# Patient Record
Sex: FEMALE | Race: WHITE | Employment: FULL TIME | URBAN - METROPOLITAN AREA
[De-identification: names, ages, dates, MRNs, and addresses within clinical notes are randomized per-mention and may not be internally consistent; named-entity substitution may affect disease eponyms.]

---

## 2018-05-24 ENCOUNTER — TRANSFERRED RECORDS (OUTPATIENT)
Dept: HEALTH INFORMATION MANAGEMENT | Facility: CLINIC | Age: 28
End: 2018-05-24

## 2018-06-19 ENCOUNTER — TRANSFERRED RECORDS (OUTPATIENT)
Dept: HEALTH INFORMATION MANAGEMENT | Facility: CLINIC | Age: 28
End: 2018-06-19

## 2018-07-17 ENCOUNTER — TRANSFERRED RECORDS (OUTPATIENT)
Dept: HEALTH INFORMATION MANAGEMENT | Facility: CLINIC | Age: 28
End: 2018-07-17

## 2020-02-20 ENCOUNTER — HOSPITAL PATHOLOGY (OUTPATIENT)
Dept: OTHER | Facility: CLINIC | Age: 30
End: 2020-02-20

## 2020-02-24 LAB — COPATH REPORT: NORMAL

## 2023-12-12 ENCOUNTER — OFFICE VISIT (OUTPATIENT)
Dept: PLASTIC SURGERY | Facility: CLINIC | Age: 33
End: 2023-12-12
Payer: COMMERCIAL

## 2023-12-12 DIAGNOSIS — J34.89 NASAL SEPTAL PERFORATION: Primary | ICD-10-CM

## 2023-12-12 NOTE — PROGRESS NOTES
Facial Plastic and Reconstructive Surgery Consultation    Referring Provider:   Referred Self, MD  No address on file    HPI:   I had the pleasure of seeing Abbie Laguerre today in clinic for consultation for nasal septal perforation. Abbie Laguerre is a 33 year old female.  She reports having a history of a septoplasty about 10 years ago.  After that time, she noticed fairly soon after the surgery that she had a hole.  It grew initially, but she thinks it has been stable.  When she initially had the hole, she had a lot of crusting and bleeding but this has not been what she is experienced more recently.  She uses AYR gel and sometimes nasal saline spray.  Besides the one prior septoplasty, she has never had any other nasal surgery.  She has no difficulties with breathing through her nose.        Review Of Systems  ROS: 10 point ROS neg other than the symptoms noted above in the HPI.    Patient Active Problem List   Diagnosis     Knee pain     Past Surgical History:   Procedure Laterality Date     DENTAL SURGERY      x5     SEPTOPLASTY, TURBINOPLASTY, COMBINED  3/16/2012    Procedure:COMBINED SEPTOPLASTY, TURBINOPLASTY; SEPTOPLASTY, TURBINOPLASTY; Surgeon:CARMITA BENAVIDES; Location: OR     Current Outpatient Medications   Medication Sig Dispense Refill     HYDROcodone-acetaminophen 5-325 MG per tablet Take 1-2 tablets by mouth every 4 hours as needed for other (Moderate to Severe Pain). 30 tablet 0     Patient has no known allergies.  Social History     Socioeconomic History     Marital status: Single     Spouse name: Not on file     Number of children: Not on file     Years of education: Not on file     Highest education level: Not on file   Occupational History     Not on file   Tobacco Use     Smoking status: Never     Smokeless tobacco: Not on file   Substance and Sexual Activity     Alcohol use: Yes     Comment: occas     Drug use: No     Sexual activity: Not on file   Other Topics Concern     Not on file   Social  History Narrative     Not on file     Social Determinants of Health     Financial Resource Strain: Not on file   Food Insecurity: Not on file   Transportation Needs: Not on file   Physical Activity: Not on file   Stress: Not on file   Social Connections: Not on file   Interpersonal Safety: Not on file   Housing Stability: Not on file     No family history on file.    PE:  Alert and Oriented, Answering Questions Appropriately  Atraumatic, Normocephalic, Face Symmetric  Skin: Rojas 2  Facial Nerve Intact and facial movement symmetric  EOMI  Nasal Exam: She has some collapse of her cartilaginous dorsum with an early saddle deformity.  She has lost some support in her tip.  Anterior anoscopy reveals pink and healthy mucosa.  See procedure note below.  Chin: Normal   Lips/Teeth/Toungue/Gums: Lips intact  Neck: Trachea midline  Chest: No wheezing, cyanosis, or stridor  Card: not diaphoretic  Neuro/Psych: CN's 2-12 intact, Moves all extremities, ambulation in intact, positive affect, no notable muscle weakness          PROCEDURE:Nasal endoscopy    Indication: Nasal Endoscopy is performed to provide a more thorough evaluation of the nasal airway than seen on standard nasal speculum exam.    Performed by: Dr. Fe Roche MD  Written consent was obtained prior to procedure.     Findings are as follows:   She has a large cartilaginous perforation that is 2.25 cm in length and 1 cm in height.  The edges are healthy.  No significant crusting.  She has about 8 mm of caudal septum.          IMPRESSION/PLAN: Abbie Laguerre is a 33 year old female with a nasal septal perforation.  We discussed that this is most likely secondary to her surgery that she had shortly before she noticed the whole.  She has no history of any autoimmune conditions.  The perforation is healthy.  She has no breathing difficulties.  She does have an early saddle deformity, but this does not bother her.  She is doing a good job of keeping the  perforation healthy.  We discussed that it is likely the perforation was caused by the prior surgery.  I think watchful waiting is completely appropriate at this point, but I do want to keep a close eye on the perforation.  We discussed that if it is getting bigger or changing we would likely want to go forward with surgery sooner rather than later.  We did discuss what surgery would look like including open septorhinoplasty approach and autologous rib graft harvest.  I will see her back in 6 months to reevaluate the perforation, I counseled her to come in sooner if she notices any changes prior to that time. She will continue saline spray and Ayr gel.         Photodocumentation was obtained.

## 2023-12-13 NOTE — PROGRESS NOTES
Updated photodocumentation obtained (see media tab).    Signed informed consent for nasal endoscopy obtained.    Mireya Muhammad RN  12/13/2023 12:04 PM

## 2024-05-01 ENCOUNTER — TRANSCRIBE ORDERS (OUTPATIENT)
Dept: CALL CENTER | Age: 34
End: 2024-05-01
Payer: COMMERCIAL

## 2024-05-01 DIAGNOSIS — B99.9 INFECTION: Primary | ICD-10-CM

## 2024-05-02 ENCOUNTER — TELEPHONE (OUTPATIENT)
Dept: INFECTIOUS DISEASES | Facility: CLINIC | Age: 34
End: 2024-05-02
Payer: COMMERCIAL

## 2024-05-02 NOTE — TELEPHONE ENCOUNTER
Terell Fernandez MD Fontana, Lauren, DO; Polo Naik, CHAYA; Terell Aguilera MD; Ansley Lopez MD Hey,    Sorry, on inpatient and we have been getting slammed the past few days so hadn't looked at this yet.    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 Looking at her labs, I don't think I would call her LFTs elevated (only abnormal was an ALT of 40, now trickling down to 37), and she doesn't have eosinophilia thankfully, so I think a 5/29 appointment would be fine. By then, hopefully she's had the EGD/biopsy and we have some more data.    -Yovany          Previous Messages       ----- Message -----  From: Chelo Andres DO  Sent: 5/2/2024  11:48 AM CDT  To: Ansley Lopez MD; Polo Naik RN; *  Subject: RE: Referral questions                          Yovany or Ansley -Can you please review this to determine the urgency of consult? Polo had reached out to me but would appreciate your help in triaging the travel cases on timing. I can always help but want to include you in this process.    Chelo  ----- Message -----  From: Polo Naik RN  Sent: 5/2/2024   9:25 AM CDT  To: Chelo Andres DO  Subject: FW: Referral questions                          Hi Dr. Andres,  Wondering if you have time to review this patient's referral today to assess for urgency. I sent this message to the travel providers yesterday but haven't heard back yet. It looks like our next in-person 60m opening with a travel med provider is Dr. Fernandez's [facility] slots on 5/29.    Thanks!  Polo Naik RN  Infectious Disease on 5/2/2024 at 9:25 AM  ----- Message -----  From: Polo Naik RN  Sent: 5/1/2024   1:16 PM CDT  To: Ansley Lopez MD; Radha Vasquez MD; *  Subject: Referral questions                              Hello!    We received a  referral today for post-travel services for this patient.    - She traveled to [place] and [place] from 03/08-03/23.    - She had an office visit with Dr. Bill at [facility] on 4/26 and her symptoms are described as: Generalized abdominal discomfort, increased bowel movements, bloating. The patient developed abdominal pain nausea generalized abdominal discomfort and mild change in bowel movements but no overt liquidy stools. Her symptoms have persisted over the past month. Patient was prescribed ciprofloxacin 500 twice a day for 5 days by this provider. Patient also reports excessive burping.    - Denies fever, blood in stool, chest pain, cough or trouble swallowing.    - The patient has stool labs ordered but hasn't dropped them off yet. H. Pylori came back negative.    Wondering your thoughts on this patient-- our next 60 minute in-person appointment with a travel provider is Wednesday, May 29th in Dr. Fernandez's [facility] slots at the end of the day. Let me know your thoughts on getting this patient scheduled, if they need to be seen, the timing you would recommend, etc.    Thanks all,  Polo Naik RN  Infectious Disease on 5/1/2024 at 1:14 PM

## 2024-05-03 NOTE — CONFIDENTIAL NOTE
DIAGNOSIS:  Infection    DATE RECEIVED:  05.29.2024    NOTES (Gather within 2 years) STATUS DETAILS   OFFICE NOTE from referring provider       OFFICE NOTE from other specialist Care Everywhere 03.07.2024 Nely Vaca DO [facility]    03.01.2024 Consuelo Tovar MD [facility]   DISCHARGE SUMMARY from hospital     DISCHARGE REPORT from the ER     LABS (any labs) Care Everywhere    MEDICATION LIST Care Everywhere    IMAGING  (NEED IMAGES AND REPORTS)     Osteomyelitis: Foot imaging      Liver Abscess: Abdominal imaging     Other (anything related to diagnoses

## 2024-05-10 ENCOUNTER — LAB REQUISITION (OUTPATIENT)
Dept: LAB | Facility: CLINIC | Age: 34
End: 2024-05-10

## 2024-05-10 DIAGNOSIS — R10.9 UNSPECIFIED ABDOMINAL PAIN: ICD-10-CM

## 2024-05-10 DIAGNOSIS — Z13.1 ENCOUNTER FOR SCREENING FOR DIABETES MELLITUS: ICD-10-CM

## 2024-05-10 DIAGNOSIS — Z13.29 ENCOUNTER FOR SCREENING FOR OTHER SUSPECTED ENDOCRINE DISORDER: ICD-10-CM

## 2024-05-10 LAB
ERYTHROCYTE [DISTWIDTH] IN BLOOD BY AUTOMATED COUNT: 12.4 % (ref 10–15)
HBA1C MFR BLD: 5.1 %
HCT VFR BLD AUTO: 40.4 % (ref 35–47)
HGB BLD-MCNC: 14.1 G/DL (ref 11.7–15.7)
MCH RBC QN AUTO: 30.6 PG (ref 26.5–33)
MCHC RBC AUTO-ENTMCNC: 34.9 G/DL (ref 31.5–36.5)
MCV RBC AUTO: 88 FL (ref 78–100)
PLATELET # BLD AUTO: 338 10E3/UL (ref 150–450)
RBC # BLD AUTO: 4.61 10E6/UL (ref 3.8–5.2)
WBC # BLD AUTO: 6.6 10E3/UL (ref 4–11)

## 2024-05-10 PROCEDURE — 83036 HEMOGLOBIN GLYCOSYLATED A1C: CPT | Performed by: OBSTETRICS & GYNECOLOGY

## 2024-05-10 PROCEDURE — 84443 ASSAY THYROID STIM HORMONE: CPT | Performed by: OBSTETRICS & GYNECOLOGY

## 2024-05-10 PROCEDURE — 85027 COMPLETE CBC AUTOMATED: CPT | Performed by: OBSTETRICS & GYNECOLOGY

## 2024-05-11 LAB — TSH SERPL DL<=0.005 MIU/L-ACNC: 1.08 UIU/ML (ref 0.3–4.2)

## 2024-05-29 ENCOUNTER — PRE VISIT (OUTPATIENT)
Dept: INFECTIOUS DISEASES | Facility: CLINIC | Age: 34
End: 2024-05-29
Payer: COMMERCIAL

## 2024-06-26 ENCOUNTER — PREP FOR PROCEDURE (OUTPATIENT)
Dept: MULTI SPECIALTY CLINIC | Facility: CLINIC | Age: 34
End: 2024-06-26
Payer: COMMERCIAL

## 2024-07-06 ENCOUNTER — HEALTH MAINTENANCE LETTER (OUTPATIENT)
Age: 34
End: 2024-07-06

## 2024-07-12 ENCOUNTER — PREP FOR PROCEDURE (OUTPATIENT)
Dept: MULTI SPECIALTY CLINIC | Facility: CLINIC | Age: 34
End: 2024-07-12
Payer: COMMERCIAL

## 2024-07-12 DIAGNOSIS — Z00.6 EXAMINATION OF PARTICIPANT OR CONTROL IN CLINICAL RESEARCH: Primary | ICD-10-CM

## 2024-07-12 RX ORDER — LIDOCAINE 40 MG/G
CREAM TOPICAL
Status: CANCELLED | OUTPATIENT
Start: 2024-07-12

## 2024-07-12 NOTE — H&P
Abbie Laguerre is an 34 year old female.    Past Medical History:   Diagnosis Date    Anemia     Chronic infection     freq sinus infections       Allergies: No Known Allergies    Active Problems:    * No active hospital problems. *    There were no vitals taken for this visit.      Allergies: NKDA  PMH: No chronic illnesses  ROS: Negative  Meds: not taking any meds currently    /82     [id number]  Normal cardiopulmonary physical exam.   Airway assessment: Mallampati I   Mandibular protrusion test Class A      Assessment:  Healthy  participant.   Informed and understands:   NPO 8 hours prior to procedure      Will receive conscious sedation during procedure      Needs someone to pick them up after procedure and be observed 6 hours after procedure.       Plan:  Research Bronchoscopy on 7/ 17/24  Signed consent in [employer] and copy provided to participant via email  Information regarding the procedure provided to participant via email.        Latonia Salmeron MD  7/12/2024

## 2024-07-24 ENCOUNTER — HOSPITAL ENCOUNTER (OUTPATIENT)
Facility: CLINIC | Age: 34
Discharge: HOME | End: 2024-07-24
Attending: INTERNAL MEDICINE | Admitting: INTERNAL MEDICINE

## 2024-07-24 VITALS
DIASTOLIC BLOOD PRESSURE: 77 MMHG | RESPIRATION RATE: 17 BRPM | SYSTOLIC BLOOD PRESSURE: 102 MMHG | OXYGEN SATURATION: 96 % | HEART RATE: 109 BPM

## 2024-07-24 PROCEDURE — 31624 DX BRONCHOSCOPE/LAVAGE: CPT | Performed by: INTERNAL MEDICINE

## 2024-07-24 PROCEDURE — 250N000011 HC RX IP 250 OP 636: Performed by: INTERNAL MEDICINE

## 2024-07-24 PROCEDURE — G0500 MOD SEDAT ENDO SERVICE >5YRS: HCPCS | Performed by: INTERNAL MEDICINE

## 2024-07-24 PROCEDURE — 250N000009 HC RX 250: Performed by: INTERNAL MEDICINE

## 2024-07-24 RX ORDER — LIDOCAINE HYDROCHLORIDE 10 MG/ML
INJECTION, SOLUTION INFILTRATION; PERINEURAL PRN
Status: DISCONTINUED | OUTPATIENT
Start: 2024-07-24 | End: 2024-07-24 | Stop reason: HOSPADM

## 2024-07-24 RX ORDER — LIDOCAINE 40 MG/G
CREAM TOPICAL
Status: DISCONTINUED | OUTPATIENT
Start: 2024-07-24 | End: 2024-07-24 | Stop reason: HOSPADM

## 2024-07-24 RX ORDER — LIDOCAINE HYDROCHLORIDE 40 MG/ML
INJECTION, SOLUTION RETROBULBAR PRN
Status: DISCONTINUED | OUTPATIENT
Start: 2024-07-24 | End: 2024-07-24 | Stop reason: HOSPADM

## 2024-07-24 RX ORDER — MAGNESIUM HYDROXIDE 1200 MG/15ML
LIQUID ORAL PRN
Status: DISCONTINUED | OUTPATIENT
Start: 2024-07-24 | End: 2024-07-24 | Stop reason: HOSPADM

## 2024-07-24 RX ORDER — CETIRIZINE HYDROCHLORIDE 5 MG/1
5 TABLET ORAL DAILY
COMMUNITY

## 2024-07-24 RX ORDER — FENTANYL CITRATE 50 UG/ML
INJECTION, SOLUTION INTRAMUSCULAR; INTRAVENOUS PRN
Status: DISCONTINUED | OUTPATIENT
Start: 2024-07-24 | End: 2024-07-24 | Stop reason: HOSPADM

## 2024-07-24 ASSESSMENT — ACTIVITIES OF DAILY LIVING (ADL)
ADLS_ACUITY_SCORE: 35

## 2024-07-24 NOTE — DISCHARGE INSTRUCTIONS
Discharge Instructions after Bronchoscopy    Activity  ___ You had medicine to relax and for pain. You may feel dizzy or sleepy.  For 24 hours:   Do not drive or use heavy equipment.   Do not make important decisions.   Do not drink any alcohol.    Diet  ___ When you can swallow easily, you may go back to your regular diet, medicines  and light exercise.    Follow-up  ___ We took fluid samples to study. We will call you with the results in about 10 business days.    Call right away if you have:   Unusual chest pain   Temperature above 100.6  F (37.5  C)   Coughing that does not stop.    If you have severe pain, bleeding, or shortness of breath, go to an emergency room.    If you have questions, call:  Monday to Friday, 8 a.m. to 4:30 p.m.  Adult Pulmonology Clinic: [phone]    After hours:  [facility]: [phone] (Ask for the pulmonary fellow on call)

## 2024-07-24 NOTE — OR NURSING
Pt tolerated bronchoscopy with BAL of 2 separate sites for research purposes, very well, under conscious sedation. Pt was returned to recovery on RA

## 2024-07-31 NOTE — PROCEDURES
After time out study participant received conscious sedation with Fentanyl 50mcg and midazolam 1mg IV  Right nostril was numbed with local Lidocaine 2% gel and 2 applicators were introduced without obstruction.   Study participant was placed supine and scope  was introduced through the right nostril. Vocal cords were visualized healthy mucosa appearance symmetrical movement with phonation  9cc of  Lidocaine 4% was given at the vocal cords.   12cc of lidocaine 1% was given through the vocal cords.      Scope was introduced through vocal cords main brad, R main bronchus, RUL, R BI and RML were visualized with normal mucosa.   Scope was wedged in the RML and 2 aliquots of 60cc normal saline were manually instilled in the medial segment and the same amount was instilled in the lateral segment and retrieved back. Scope was removed from study participant and study participant was speaking normal sentences and experiencing regular coughing. Study participant was moved to the recovery area.   Start time 9:06am   End time 9:22am      I performed the entire procedure and will follow up the study participant   Volunteer tolerated procedure well, no complications.

## 2025-07-13 ENCOUNTER — HEALTH MAINTENANCE LETTER (OUTPATIENT)
Age: 35
End: 2025-07-13

## (undated) RX ORDER — LIDOCAINE HYDROCHLORIDE 10 MG/ML
INJECTION, SOLUTION EPIDURAL; INFILTRATION; INTRACAUDAL; PERINEURAL
Status: DISPENSED
Start: 2024-07-24

## (undated) RX ORDER — FENTANYL CITRATE 50 UG/ML
INJECTION, SOLUTION INTRAMUSCULAR; INTRAVENOUS
Status: DISPENSED
Start: 2024-07-24

## (undated) RX ORDER — LIDOCAINE HYDROCHLORIDE 20 MG/ML
SOLUTION OROPHARYNGEAL
Status: DISPENSED
Start: 2024-07-24